# Patient Record
Sex: MALE | Race: WHITE | NOT HISPANIC OR LATINO | URBAN - METROPOLITAN AREA
[De-identification: names, ages, dates, MRNs, and addresses within clinical notes are randomized per-mention and may not be internally consistent; named-entity substitution may affect disease eponyms.]

---

## 2021-08-09 ENCOUNTER — WEB ENCOUNTER (OUTPATIENT)
Dept: URBAN - METROPOLITAN AREA CLINIC 96 | Facility: CLINIC | Age: 46
End: 2021-08-09

## 2021-09-17 ENCOUNTER — OFFICE VISIT (OUTPATIENT)
Dept: URBAN - METROPOLITAN AREA CLINIC 96 | Facility: CLINIC | Age: 46
End: 2021-09-17
Payer: COMMERCIAL

## 2021-09-17 ENCOUNTER — WEB ENCOUNTER (OUTPATIENT)
Dept: URBAN - METROPOLITAN AREA CLINIC 96 | Facility: CLINIC | Age: 46
End: 2021-09-17

## 2021-09-17 VITALS
SYSTOLIC BLOOD PRESSURE: 114 MMHG | HEIGHT: 70 IN | WEIGHT: 292 LBS | HEART RATE: 95 BPM | TEMPERATURE: 98.4 F | DIASTOLIC BLOOD PRESSURE: 75 MMHG | BODY MASS INDEX: 41.8 KG/M2

## 2021-09-17 DIAGNOSIS — D36.9 TUBULAR ADENOMA: ICD-10-CM

## 2021-09-17 DIAGNOSIS — Z83.71 FAMILY HISTORY OF COLONIC POLYPS: ICD-10-CM

## 2021-09-17 DIAGNOSIS — Z86.010 PERSONAL HISTORY OF COLONIC POLYPS: ICD-10-CM

## 2021-09-17 DIAGNOSIS — D12.6 SERRATED ADENOMA OF COLON: ICD-10-CM

## 2021-09-17 DIAGNOSIS — K21.00 GASTROESOPHAGEAL REFLUX DISEASE WITH ESOPHAGITIS WITHOUT HEMORRHAGE: ICD-10-CM

## 2021-09-17 DIAGNOSIS — E66.01 MORBID OBESITY: ICD-10-CM

## 2021-09-17 PROBLEM — 238136002: Status: ACTIVE | Noted: 2021-09-17

## 2021-09-17 PROCEDURE — 99214 OFFICE O/P EST MOD 30 MIN: CPT | Performed by: INTERNAL MEDICINE

## 2021-09-17 RX ORDER — FAMOTIDINE 40 MG/1
1 TABLET AT BEDTIME TABLET, FILM COATED ORAL ONCE A DAY
Qty: 90 TABLET | Refills: 3 | OUTPATIENT

## 2021-09-17 RX ORDER — SODIUM, POTASSIUM,MAG SULFATES 17.5-3.13G
177 ML SOLUTION, RECONSTITUTED, ORAL ORAL AS DIRECTED
Qty: 1 BOX | Refills: 0 | OUTPATIENT
Start: 2021-09-17 | End: 2021-09-18

## 2021-09-17 RX ORDER — PANTOPRAZOLE SODIUM 20 MG/1
TAPER TABLET, DELAYED RELEASE ORAL
Qty: 70 TABLET | Refills: 0 | OUTPATIENT

## 2021-09-17 NOTE — HPI-TODAY'S VISIT:
45 y.o. WM Not seen in 3 yrs  When sleeps at night, and on R side, will have reflux that causes lots of pain It will reflux up into his mouth and led to emesis Feels like food sits in upper abd; has to straightened out to help w/ pain Gained a lot of weight over the past yrs On 3 yr plan for c-scope Bowels normal, every AM, w/o blood

## 2021-10-04 PROBLEM — 428283002: Status: ACTIVE | Noted: 2021-09-17

## 2021-11-09 ENCOUNTER — CLAIMS CREATED FROM THE CLAIM WINDOW (OUTPATIENT)
Dept: URBAN - METROPOLITAN AREA CLINIC 4 | Facility: CLINIC | Age: 46
End: 2021-11-09
Payer: COMMERCIAL

## 2021-11-09 ENCOUNTER — OFFICE VISIT (OUTPATIENT)
Dept: URBAN - METROPOLITAN AREA SURGERY CENTER 18 | Facility: SURGERY CENTER | Age: 46
End: 2021-11-09
Payer: COMMERCIAL

## 2021-11-09 DIAGNOSIS — D12.2 ADENOMA OF ASCENDING COLON: ICD-10-CM

## 2021-11-09 DIAGNOSIS — D12.3 ADENOMA OF TRANSVERSE COLON: ICD-10-CM

## 2021-11-09 DIAGNOSIS — Z86.010 ADENOMAS PERSONAL HISTORY OF COLONIC POLYPS: ICD-10-CM

## 2021-11-09 DIAGNOSIS — K63.89 POLYP, HYPERPLASTIC: ICD-10-CM

## 2021-11-09 DIAGNOSIS — D12.2 BENIGN NEOPLASM OF ASCENDING COLON: ICD-10-CM

## 2021-11-09 DIAGNOSIS — D12.3 BENIGN NEOPLASM OF TRANSVERSE COLON: ICD-10-CM

## 2021-11-09 PROCEDURE — 88305 TISSUE EXAM BY PATHOLOGIST: CPT | Performed by: PATHOLOGY

## 2021-11-09 PROCEDURE — 45385 COLONOSCOPY W/LESION REMOVAL: CPT | Performed by: INTERNAL MEDICINE

## 2021-11-09 PROCEDURE — G8907 PT DOC NO EVENTS ON DISCHARG: HCPCS | Performed by: INTERNAL MEDICINE

## 2021-11-09 PROCEDURE — 45380 COLONOSCOPY AND BIOPSY: CPT | Performed by: INTERNAL MEDICINE

## 2021-11-09 RX ORDER — FAMOTIDINE 40 MG/1
1 TABLET AT BEDTIME TABLET, FILM COATED ORAL ONCE A DAY
Qty: 90 TABLET | Refills: 3 | Status: ACTIVE | COMMUNITY

## 2021-11-09 RX ORDER — PANTOPRAZOLE SODIUM 20 MG/1
TAPER TABLET, DELAYED RELEASE ORAL
Qty: 70 TABLET | Refills: 0 | Status: ACTIVE | COMMUNITY

## 2021-11-09 RX ORDER — PANTOPRAZOLE SODIUM 40 MG/1
1 TABLET TABLET, DELAYED RELEASE ORAL ONCE A DAY
Qty: 90 TABLET | Refills: 3 | OUTPATIENT

## 2022-02-14 ENCOUNTER — WEB ENCOUNTER (OUTPATIENT)
Dept: URBAN - METROPOLITAN AREA CLINIC 96 | Facility: CLINIC | Age: 47
End: 2022-02-14

## 2022-02-14 RX ORDER — PANTOPRAZOLE SODIUM 40 MG/1
1 TABLET TABLET, DELAYED RELEASE ORAL ONCE A DAY
Qty: 90 TABLET | Refills: 3

## 2023-02-20 ENCOUNTER — WEB ENCOUNTER (OUTPATIENT)
Dept: URBAN - METROPOLITAN AREA CLINIC 96 | Facility: CLINIC | Age: 48
End: 2023-02-20

## 2023-02-20 RX ORDER — PANTOPRAZOLE SODIUM 40 MG/1
1 TABLET TABLET, DELAYED RELEASE ORAL ONCE A DAY
Qty: 90 | Refills: 0

## 2023-03-15 ENCOUNTER — WEB ENCOUNTER (OUTPATIENT)
Dept: URBAN - METROPOLITAN AREA CLINIC 98 | Facility: CLINIC | Age: 48
End: 2023-03-15

## 2023-03-15 ENCOUNTER — DASHBOARD ENCOUNTERS (OUTPATIENT)
Age: 48
End: 2023-03-15

## 2023-03-15 ENCOUNTER — OFFICE VISIT (OUTPATIENT)
Dept: URBAN - METROPOLITAN AREA CLINIC 98 | Facility: CLINIC | Age: 48
End: 2023-03-15
Payer: COMMERCIAL

## 2023-03-15 VITALS
DIASTOLIC BLOOD PRESSURE: 94 MMHG | HEART RATE: 84 BPM | BODY MASS INDEX: 41.32 KG/M2 | TEMPERATURE: 97 F | HEIGHT: 70 IN | WEIGHT: 288.6 LBS | SYSTOLIC BLOOD PRESSURE: 146 MMHG

## 2023-03-15 DIAGNOSIS — E66.01 MORBID OBESITY: ICD-10-CM

## 2023-03-15 DIAGNOSIS — K21.00 GASTROESOPHAGEAL REFLUX DISEASE WITH ESOPHAGITIS WITHOUT HEMORRHAGE: ICD-10-CM

## 2023-03-15 DIAGNOSIS — Z86.010 PERSONAL HISTORY OF COLONIC POLYPS: ICD-10-CM

## 2023-03-15 PROCEDURE — 99213 OFFICE O/P EST LOW 20 MIN: CPT

## 2023-03-15 RX ORDER — FAMOTIDINE 40 MG/1
1 TABLET AT BEDTIME TABLET, FILM COATED ORAL ONCE A DAY
Qty: 90 TABLET | Refills: 3 | Status: ON HOLD | COMMUNITY

## 2023-03-15 RX ORDER — PANTOPRAZOLE SODIUM 40 MG/1
1 TABLET TABLET, DELAYED RELEASE ORAL ONCE A DAY
Qty: 90 | Refills: 3

## 2023-03-15 RX ORDER — PANTOPRAZOLE SODIUM 20 MG/1
TAPER TABLET, DELAYED RELEASE ORAL
Qty: 70 TABLET | Refills: 0 | Status: ON HOLD | COMMUNITY

## 2023-03-15 RX ORDER — PANTOPRAZOLE SODIUM 40 MG/1
1 TABLET TABLET, DELAYED RELEASE ORAL ONCE A DAY
Qty: 90 | Refills: 0 | Status: ACTIVE | COMMUNITY

## 2023-03-15 NOTE — HPI-TODAY'S VISIT:
Previously in 2021 with Dr. Jane, 45 y.o. WM Not seen in 3 yrs  When sleeps at night, and on R side, will have reflux that causes lots of pain It will reflux up into his mouth and led to emesis Feels like food sits in upper abd; has to straightened out to help w/ pain Gained a lot of weight over the past yrs On 3 yr plan for c-scope Bowels normal, every AM, w/o blood . Today on 3/15/2023, Patient is a 47-year-old male who presents for medication refill.   Last colonoscopy completed 11/9/2021.  Findings included 1, 4 mm polyp in the ascending colon.  1, 3 mm polyp in the cecum.  1, 3 mm polyp in the transverse colon.  1, 10 mm polyp in the transverse colon.  Examination was otherwise normal.   Pathology results revealed hyperplastic polyp in the cecum.  Tubular adenoma and hyperplastic polyp in the ascending colon.  And tubular adenoma in the transverse colon.  Was recommended repeat in 5 years.   Of note patient did have upper endoscopy 10/16/2018 which revealed LA grade a reflux esophagitis.  Small hiatal hernia with no gross lesions in the stomach.  Erythematous duodenopathy.   Pathology results revealed reactive gastropathy with no H. pylori.  Reflux type changes in the GE junction with no intestinal metaplasia.   Currently takes pantoprazole 40 mg once a day for reflux. He endorses he ran out of PPI for 3 days- developed heartburn  Denies dysphagia  Denies melena- will take NSAIDs weekly for muscle pain but denies daily use  Bms are described as regular- denies BRBPR  Denies unintetnional weight, fever, chills or night sweats  Has labs done yearly with PCP- denies anemia

## 2025-04-18 ENCOUNTER — TELEPHONE ENCOUNTER (OUTPATIENT)
Dept: URBAN - METROPOLITAN AREA CLINIC 96 | Facility: CLINIC | Age: 50
End: 2025-04-18